# Patient Record
Sex: FEMALE | Race: WHITE | Employment: UNEMPLOYED | URBAN - METROPOLITAN AREA
[De-identification: names, ages, dates, MRNs, and addresses within clinical notes are randomized per-mention and may not be internally consistent; named-entity substitution may affect disease eponyms.]

---

## 2020-11-02 ENCOUNTER — HOSPITAL ENCOUNTER (OUTPATIENT)
Dept: PHYSICAL THERAPY | Age: 60
Discharge: HOME | End: 2020-11-02
Payer: MEDICARE

## 2020-11-02 PROCEDURE — 97161 PT EVAL LOW COMPLEX 20 MIN: CPT

## 2020-11-02 PROCEDURE — 97530 THERAPEUTIC ACTIVITIES: CPT

## 2020-11-02 PROCEDURE — 97110 THERAPEUTIC EXERCISES: CPT

## 2020-11-02 NOTE — PROGRESS NOTES
In [address] [place] [address], [address] [place] [address], [address] [place], [place] [address]y [address],[address]  [phone] fax      Plan of Care/ Statement of Necessity for Physical Therapy Services    Patient name: Kwasi Tucker Start of Care: 2020   Referral source: Filippo Horton MD : 1960    Medical Diagnosis: Low back pain [M54.5]  Payor: Raoul Garrett / Plan: [place] MED[place] A & B / Product Type: Medicare /  Onset Date:1+ year ago     Treatment Diagnosis: LBP   Prior Hospitalization: see medical history Provider#: [id number]   Medications: Verified on Patient summary List    Comorbidities: fibromyalgia, arthritis, thyroid problems, HTN, Pre cancer of the colon, interstitial cystitis, latex allergy, knee surgery X 2 right LE   Prior Level of Function: I all areas of ADLs and activities, on disability, tolerated household and community activities, drove       The Plan of Care and following information is based on the information from the initial evaluation. Assessment/ key information: 63 YO female diagnosed as above and with S/S consistent with above diagnosis presents to skilled outpatient PT CCO LBP worse on the left side. Onset 1+ years ago insideous onset that began with left lower leg pain and numbness. With certain movements and positions the pain would shoot up the left leg. CCO pain and soreness even at rest/sitting in the left lower back. She additionally has reports of right knee pain and PMHX with the right knee since  that she also attributes with some of her back pain due to limping.  decrease core and  LE strength, TTP left SIJ and piriformis, Patient demonstrates the potential to make gains with improved ROM, strength, endurance/activity tolerance, functional FOTO survey score   and all within a reasonable time frame so as to increase their functional independence with ADLs and activities for carryover to  Improved quality of life, tolerance to household and community activities and ability to resume her walking program. Patient requires skilled Physical Therapy so as to monitor their response to and modify their treatment plan accordingly. Patient appears to be an appropriate candidate for skilled outpatient Physical Therapy.     Evaluation Complexity History HIGH Complexity :3+ comorbidities / personal factors will impact the outcome/ POC ; Examination MEDIUM Complexity : 3 Standardized tests and measures addressing body structure, function, activity limitation and / or participation in recreation  ;Presentation LOW Complexity : Stable, uncomplicated  ;Clinical Decision Making MEDIUM Complexity : FOTO score of 26-74  Overall Complexity Rating: LOW   Problem List: pain affecting function, decrease ROM, decrease strength, decrease ADL/ functional abilitiies, decrease activity tolerance, decrease flexibility/ joint mobility, decrease transfer abilities and other FOTO 48   Treatment Plan may include any combination of the following: Therapeutic exercise, Therapeutic activities, Neuromuscular re-education, Physical agent/modality, Manual therapy, Patient education, Self Care training and Home safety training  Patient / Family readiness to learn indicated by: asking questions, trying to perform skills and interest  Persons(s) to be included in education: patient (P)  Barriers to Learning/Limitations: None  Patient Goal (s):  to learn how to deal with it so that it doesn't get worse and hope it gets better, avoid surgery  Patient Self Reported Health Status: did not answer  Rehabilitation Potential: good    Short Term Goals: To be accomplished in 5 treatments:   1 patient willhvae established and be I with HEP to aid with self management at discharge   EVAL issued   CURRENT    2 patient will have pain 3-4/10 to aid with increase tolerance to walking several blocks    EVAL 5, limited a lot   CURRENT    Long Term Goals:  To be accomplished in 12 treatments:   1 patient will have pain 2/10 to aid with increase tolerance to walking several blocks    EVAL 5, limited a lot   CURRENT   2 patient will have FOTO 56 to show projected gains in function and activities   EVAL 48   CURRENT   3 patient will tolerate TM 1/2 mile with no increase pain for carryover to resuming her walking program   EVAL used to tolerate 3 miles a day   CURRENT   4 patient will report overall 50% improvement to aid with increase tolerance to doing her usual activities   EVAL moderate difficulty    CURRENT    Frequency / Duration: Patient to be seen 2 times per week for 12 treatments. Patient/ Caregiver education and instruction: Diagnosis, prognosis, self care, activity modification and exercises   [x]  Plan of care has been reviewed with PTA    Certification Period: 11/2/20 - 12/1/20  Amelia Costa, PT 11/2/2020 5:47 PM    ________________________________________________________________________    I certify that the above Therapy Services are being furnished while the patient is under my care. I agree with the treatment plan and certify that this therapy is necessary.     Physician's Signature:____________Date:_________TIME:________    [facility], Date and Time must be completed for valid certification **    Please sign and return to In [address] [place] [address], [address] [place] [address], [address] [place] [address],[facility] 300  [phone] fax

## 2020-11-04 ENCOUNTER — HOSPITAL ENCOUNTER (OUTPATIENT)
Dept: PHYSICAL THERAPY | Age: 60
Discharge: HOME | End: 2020-11-04
Payer: MEDICARE

## 2020-11-04 PROCEDURE — 97140 MANUAL THERAPY 1/> REGIONS: CPT

## 2020-11-04 PROCEDURE — 97112 NEUROMUSCULAR REEDUCATION: CPT

## 2020-11-04 PROCEDURE — 97110 THERAPEUTIC EXERCISES: CPT

## 2020-11-04 NOTE — PROGRESS NOTES
PT DAILY TREATMENT NOTE     Patient Name: Jonathon Quiñonez  Date:2020  : 1960  [x]  Patient  Verified  Payor: Sugey Elders / Plan: [place] MEDICARE PART A & B / Product Type: Medicare /    In time: 3:53 Out time:4:48  Total Treatment Time (min): 48  Visit #: 2 of 12    Medicare/BCBS Only   Total Timed Codes (min):  38 1:1 Treatment Time:  38       Treatment Area: Low back pain [M54.5]    SUBJECTIVE  Pain Level (0-10 scale): 5  Any medication changes, allergies to medications, adverse drug reactions, diagnosis change, or new procedure performed?: [x] No    [] Yes (see summary sheet for update)  Subjective functional status/changes:   [] No changes reporte  \"tightness. \"  OBJECTIVE    Modality rationale: decrease edema, decrease inflammation, decrease pain and increase tissue extensibility to improve the patients ability to perform ADL   Min Type Additional Details    [] Estim:  []Unatt       []IFC  []Premod                        []Other:  []w/ice   []w/heat  Position:  Location:    [] Estim: []Att    []TENS instruct  []NMES                    []Other:  []w/US   []w/ice   []w/heat  Position:  Location:    []  Traction: [] Cervical       []Lumbar                       [] Prone          []Supine                       []Intermittent   []Continuous Lbs:  [] before manual  [] after manual    []  Ultrasound: []Continuous   [] Pulsed                           []1MHz   []3MHz W/cm2:  Location:    []  Iontophoresis with dexamethasone         Location: [] Take home patch   [] In clinic   10 [x]  Ice  post   []  heat  []  Ice massage  []  Laser   []  Anodyne Position:prone  Location:(B) LSP    []  Laser with stim  []  Other:  Position:  Location:    []  Vasopneumatic Device Pressure:       [] lo [] med [] hi   Temperature: [] lo [] med [] hi   [x] Skin assessment post-treatment:  [x]intact []redness- no adverse reaction    []redness - adverse reaction:      min []Eval                  []Re-Eval       20 min Therapeutic Exercise:  [x] See flow sheet :   Rationale: increase ROM and increase strength to improve the patients ability to perform ADL      min Therapeutic Activity:  [x]  See flow sheet :   Rationale: increase ROM and increase strength  to improve the patients ability to perform ADL      8 min Neuromuscular Re-education:  []  See flow sheet :   Rationale: increase ROM, increase strength, improve coordination, improve balance and increase proprioception  to improve the patients ability to perform ADL     10 min Manual Therapy:  P/A mob  3-4 TSP/LSP  prone   The manual therapy interventions were performed at a separate and distinct time from the therapeutic activities interventions. Rationale: decrease pain to perform ADL      min Gait Training:  ___ feet with ___ device on level surfaces with ___ level of assist   Rationale: With   [x] TE   [] TA   [] neuro   [] other: Patient Education: [x] Review HEP    [] Progressed/Changed HEP based on:   [] positioning   [] body mechanics   [] transfers   [] heat/ice application    [] other:      Other Objective/Functional Measures:      Pain Level (0-10 scale) post treatment: 3    ASSESSMENT/Changes in Function: Pt reports  Pain with bending at L4&L5. Pt completed each there ex fairly well. Pt exhibits tightness at mid back. Pt reports difficulty ascending steps. Following manual pain was reduced and decreased tightness. Patient will continue to benefit from skilled PT services to address functional mobility deficits, address ROM deficits, address strength deficits, analyze and address soft tissue restrictions, analyze and cue movement patterns, analyze and modify body mechanics/ergonomics and instruct in home and community integration to attain remaining goals.      [x]  See Plan of Care  []  See progress note/recertification  []  See Discharge Summary         Progress towards goals / Updated goals:     1 patient willhvae established and be I with HEP to aid with self management at discharge              EVAL issued              CURRENT progressing 11/4              2 patient will have pain 3-4/10 to aid with increase tolerance to walking several blocks               EVAL 5, limited a lot              CURRENT     Long Term Goals:  To be accomplished in 12 treatments:              1 patient will have pain 2/10 to aid with increase tolerance to walking several blocks               EVAL 5, limited a lot              CURRENT              2 patient will have FOTO 56 to show projected gains in function and activities              EVAL 48              CURRENT              3 patient will tolerate TM 1/2 mile with no increase pain for carryover to resuming her walking program              EVAL used to tolerate 3 miles a day              CURRENT              4 patient will report overall 50% improvement to aid with increase tolerance to doing her usual activities              EVAL moderate difficulty               CURRENT    PLAN  []  Upgrade activities as tolerated     []  Continue plan of care  []  Update interventions per flow sheet       []  Discharge due to:_  []  Other:_      Era Zaragoza PTA 11/4/2020  3:54 PM    Future Appointments   Date Time Provider Martir Richards   11/10/2020  3:45 PM Patrici Darryl [facility]   11/13/2020  3:45 PM Patrici Darryl [facility]   11/17/2020  2:15 PM Patrici Darryl [facility]   11/20/2020  3:00 PM Era Zaragoza, PTA [facility]   11/23/2020  1:30 PM Ceci Hernandez, PTA [facility]   11/25/2020 12:00 PM Era Zaragoza, PTA [facility]   12/1/2020  3:00 PM Candace Figureoa [facility]   12/4/2020  2:15 PM Barney Martin, PT [facility]   12/8/2020  1:30 PM Barney Martin, PT [facility]   12/11/2020  1:30 PM Barney Martin, PT [facility]

## 2020-11-10 ENCOUNTER — APPOINTMENT (OUTPATIENT)
Dept: PHYSICAL THERAPY | Age: 60
End: 2020-11-10
Payer: MEDICARE

## 2020-11-17 ENCOUNTER — APPOINTMENT (OUTPATIENT)
Dept: PHYSICAL THERAPY | Age: 60
End: 2020-11-17
Payer: MEDICARE

## 2020-11-20 ENCOUNTER — APPOINTMENT (OUTPATIENT)
Dept: PHYSICAL THERAPY | Age: 60
End: 2020-11-20
Payer: MEDICARE

## 2020-12-01 NOTE — PROGRESS NOTES
[facility] [address], [address] [place] [address], [address]  [phone] fax    Discharge Summary  Patient name: Girma Mariano Start of Care: 2020   Referral source: John Heart MD : 1960               Medical Diagnosis: Low back pain [M54.5]  Payor: Suad Peralta / Plan: [address] / Product Type: Medicare /  Onset Date:1+ year ago               Treatment Diagnosis: LBP   Prior Hospitalization: see medical history Provider#: [id number]   Medications: Verified on Patient summary List    Comorbidities: fibromyalgia, arthritis, thyroid problems, HTN, Pre cancer of the colon, interstitial cystitis, latex allergy, knee surgery X 2 right LE   Prior Level of Function: I all areas of ADLs and activities, on disability, tolerated household and community activities, drove     Visits from Start of Care: 2    Missed Visits: 5  Reporting Period : 20 to 20    Summary of Care: Pt has completed 2 skilled PT interventions to address LBP. Due to lack of attendance, pt is discharged from PT. Unable to fully reassess as planned. Discharge recommendations are to complete HEP I and follow-up with physician as needed.     Goal: patient will have established and be I with HEP to aid with self management at discharge  Status at last note/certification: progressing  Status at discharge: not met    Goal: patient will have pain 2/10 to aid with increase tolerance to walking several blocks   Status at last note/certification: 260  Status at discharge: not met    Goal: patient will have FOTO 56 to show projected gains in function and activities  Status at last note/certification: 48  Status at discharge: not met    Goal: patient will tolerate TM 1/2 mile with no increase pain for carryover to resuming her walking program  Status at last note/certification: used to tolerate 3 miles a day  Status at discharge: not met    Goal: patient will report overall 50% improvement to aid with increase tolerance to doing her usual activities  Status at last note/certification: progressing  Status at discharge: not met      ASSESSMENT/RECOMMENDATIONS:  []Discontinue therapy progressing towards or have reached established goals  []Discontinue therapy due to lack of appreciable progress towards goals  [x]Discontinue therapy due to lack of attendance or compliance  []Other:     Thank you for this referral.     Catia Allen 12/1/2020 3:46 PM

## 2020-12-04 ENCOUNTER — APPOINTMENT (OUTPATIENT)
Dept: PHYSICAL THERAPY | Age: 60
End: 2020-12-04

## 2020-12-08 ENCOUNTER — APPOINTMENT (OUTPATIENT)
Dept: PHYSICAL THERAPY | Age: 60
End: 2020-12-08

## 2020-12-11 ENCOUNTER — APPOINTMENT (OUTPATIENT)
Dept: PHYSICAL THERAPY | Age: 60
End: 2020-12-11

## 2022-06-01 ENCOUNTER — HOSPITAL ENCOUNTER (OUTPATIENT)
Dept: PHYSICAL THERAPY | Age: 62
Discharge: HOME | End: 2022-06-01
Payer: MEDICARE

## 2022-06-01 PROCEDURE — 97530 THERAPEUTIC ACTIVITIES: CPT

## 2022-06-01 PROCEDURE — 97162 PT EVAL MOD COMPLEX 30 MIN: CPT

## 2022-06-01 PROCEDURE — 97110 THERAPEUTIC EXERCISES: CPT

## 2022-06-01 NOTE — PROGRESS NOTES
PT DAILY TREATMENT LOWER [facility] [id number]    Patient Name: Jimmie Nguyen  Date:2022  : 1960  [x]  Patient  Verified  Payor: Carolyn Stanton / Plan: [place] MEDICARE PART A & B / Product Type: Medicare /    In time: 3:46P  Out time:4:31P  Total Treatment Time (min): 45  Visit #: 1 of 10    Medicare/BCBS Only   Total Timed Codes (min):  25 1:1 Treatment Time:  45     Treatment Area: Left knee pain [M25.562]  Right knee pain [M25.561]  Other low back pain [M54.59]    SUBJECTIVE  Pain Level (0-10 scale): 5  []constant []intermittent []improving []worsening []no change since onset    Any medication changes, allergies to medications, adverse drug reactions, diagnosis change, or new procedure performed?: [x] No    [] Yes (see summary sheet for update)  Subjective functional status/changes:     PLOF: Ind/ manageable pain performing ADL/IADLs, self care tasks, recreational participation, ascending/descending stairs and ambulation/standing prolonged durations  Limitations to PLOF: Increased Pain, Decreased Activity Tolerance  Mechanism of Injury: Onset of right knee pain began in  (in which pt underwent surg), left knee pain began in , followed by onset of LBP shortly after - both from insidious onset, however contributes low back pain to carrying for paraplegic  in (since has passed away from [place] [address]). States receiving dx of bakers cyst in left knee and synovial cyst in lower back. Current symptoms: Describes pain as achy/sharp located at B anterior/medial knees and B low back (left>right). Aggravating factors include standing extended durations, climbing stairs and bending back. Easing factors include rest/ice. Numbness, tingling and heaviness \"tens unit for socks\" in B LE. Denies warmth sensation and redness to B LE.   PMHx/Surgical Hx: Erythyromyalgia,Arthritis, High Blood Pressure, Latex Allergy, Hx of Pre Colon Ca, Scolosis, Interstysial Cystitis, Fibromyalgia, Urinary Incontinence, Bladder Suspension/Small Intenstine Surg, Hysterecomty, Right Patellar Fx, COPD, COPE, Chronic Fatigue, Anxiety, Right Knee Surg,  Work Hx: Disable  Pt Goals: pain relief      OBJECTIVE/EXAMINATION    20 min [x]Eval                  []Re-Eval       8 min Therapeutic Exercise:  [x] See flow sheet :   Rationale: increase ROM and increase strength to improve the patients ability to perform ADLs with greater ease     15 min Therapeutic Activity:  []  See flow sheet :   Rationale: increase patient awarness/education of relevant anatomy/patho, activity/positional modifications and prescribed HEP to improve the patients ability to return to PLOF                                                                 With   [x] TE   [x] TA   [] neuro   [] other: Patient Education: [x] Review HEP    [] Progressed/Changed HEP based on:   [] positioning   [] body mechanics   [] transfers   [] heat/ice application    [] other:      Observation/Inspection:  Skin Temperature WNL B LE    Posture: Increased Lumbar Lordosis  Increased weight shift to right LE    Gait:  Min contraletal hip drop during subsequent stance phases    Functional Sit to Stand:  Increased right LE weightshift, w/B UE push off    Balance/Stability:  SLS: Right LE: 11 sec ; Left LE: 10 sec    AROM:  Lumbar (% of WNL) -  Flex: 75  Ext: 25, p!   Right SB: 75  Left SB: 75  Right Rotation: 75  Left Rotation: 75    Knee (deg) -  TC, to be assessed at upcoming f/u    MMT (1-5):  Right Hip Flex: 4  Right Hip IR/ER: 4  Right Knee Ext: 4+  Right Knee Flex: 4+  Right Hip Abd: 4-  Left Hip Flex: 4  Left Hip IR/ER:4  Left Knee Ext: 4+  Left Knee Flex: 4+  Left Hip Abd: 3+  Core: Inability to maintain PPT w/ DL and/or SL lowering while supine on plinth    Joint Mobility:  Patellar: B Hypomobility w/ inf<>sup, med<>lateral glides  Tibiofemoral: BHypomobility w/ AP/PA glides      Flexibility: [] Unable to assess at this time  Hamstrings:    (L) Tightness= [] WNL   [] Min   [x] Mod   [] Severe    (R) Tightness= [] WNL   [] Min   [x] Mod   [] Severe  Quadriceps:    (L) Tightness= [] WNL   [] Min   [x] Mod   [] Severe    (R) Tightness= [] WNL   [] Min   [x] Mod   [] Severe  Gastroc:      (L) Tightness= [] WNL   [] Min   [x] Mod   [] Severe    (R) Tightness= [] WNL   [] Min   [x] Mod   [] Severe  Other:    Palpation: Mod TTP B QL/lumbar paraspinals, B proximal gastroc and distal adductors/hamstrings    Special Tests:  TC; To be assessed in upcoming f/u as indicated    Other:  Pelvic Symmetry: TC; To be assessed in upcoming f/u as indicated      Pain Level (0-10 scale) post treatment: 5    ASSESSMENT/Changes in Function: See POC     Patient will continue to benefit from skilled PT services to modify and progress therapeutic interventions, address functional mobility deficits, address ROM deficits, address strength deficits, analyze and address soft tissue restrictions, analyze and cue movement patterns, analyze and modify body mechanics/ergonomics, assess and modify postural abnormalities and instruct in home and community integration to attain remaining goals.      [x]  See Plan of Care  []  See progress note/recertification  []  See Discharge Summary         Progress towards goals / Updated goals:  See POC     PLAN  [x]  Upgrade activities as tolerated     [x]  Continue plan of care  [x]  Update interventions per flow sheet       []  Discharge due to:_  []  Other:_      Citlalli Kelly DPT 6/1/2022  3:53 PM

## 2022-06-01 NOTE — PROGRESS NOTES
In [address] [place] [address] Square  [address] [place] [address], [address] [place] [address], [address] [place], [place] [address]y [address],Adam 300  [phone] fax      Plan of Care/ Statement of Necessity for Physical Therapy Services    Patient name: Kerry Stone Start of Care: 2022   Referral source: Alicja Poole DO : 1960    Medical Diagnosis: Left knee pain [M25.562]  Right knee pain [M25.561]  Other low back pain [M54.59]  Payor: VA MEDICARE / Plan: VA MEDICARE PART A & B / Product Type: Medicare /  Onset Date: chronic ongoing ~ most recent flare up within past year    Treatment Diagnosis:  Pain in Right Knee, Pain in Left Knee, Low Back Pain    Prior Hospitalization: see medical history Provider#: [id number]   Medications: Verified on Patient summary List    Comorbidities: Erythyromyalgia,Arthritis, High Blood Pressure, Latex Allergy, Hx of Pre Colon Ca, Scolosis, Interstysial Cystitis, Fibromyalgia, Urinary Incontinence, Bladder Suspension/Small Intenstine Surg, Hysterecomty, Right Patellar Fx, COPD, COPE, Chronic Fatigue, Anxiety, Right Knee Surg   Prior Level of Function: Ind/ manageable pain performing ADL/IADLs, self care tasks, recreational participation, ascending/descending stairs and ambulation/standing prolonged durations      The Plan of Care and following information is based on the information from the initial evaluation. Assessment/ key information:   Patient presents to clinic secondary to insidious onset of subacute on chronic flare up of low back and B Knee pain. Today's clinical examination demonstrates significant soft tissue restrictions/TTP through B QL/lumbar paraspinals, B proximal gastroc and distal adductors/hamstrings, decreased function (evident by FOTO score), decreased AROM/flexibility, decreased strength/stability and overall mobility limitations/compensatory movement patterns.  These limitations affect the patient's ability to perform ADLs/IADLs, self care tasks, and recreational activities, efficiently, safely and pain free. The patient would benefit from skilled physical therapy interventions to address the aforementioned impairments in order to return to her PLOF of completing all functional activities with manageable pain levels and greatest independently. Evaluation Complexity History HIGH Complexity :3+ comorbidities / personal factors will impact the outcome/ POC ; Examination MEDIUM Complexity : 3 Standardized tests and measures addressing body structure, function, activity limitation and / or participation in recreation  ;Presentation MEDIUM Complexity : Evolving with changing characteristics  ; Clinical Decision Making MEDIUM Complexity : FOTO score of 26-74  Overall Complexity Rating: MEDIUM  Problem List: pain affecting function, decrease ROM, decrease strength, impaired gait/ balance, decrease ADL/ functional abilitiies, decrease activity tolerance, decrease flexibility/ joint mobility and decrease transfer abilities   Treatment Plan may include any combination of the following: Therapeutic exercise, Therapeutic activities, Neuromuscular re-education, Physical agent/modality, Gait/balance training, Manual therapy, Patient education, Self Care training, Functional mobility training, Home safety training and Stair training  Patient / Family readiness to learn indicated by: asking questions, trying to perform skills and interest  Persons(s) to be included in education: patient (P)  Barriers to Learning/Limitations: None  Patient Goal (s): pain relief  Patient Self Reported Health Status: good  Rehabilitation Potential: good    Short Term Goals: To be accomplished in 2 treatments:  1. Patient will become proficient in their HEP and will be compliant in performing that program.  Evaluation: HEP established. Long Term Goals: To be accomplished in 5 weeks:  1. Patient's pain level will be 0-4/10 with activity in order to improve patient's ability to perform normal ADLs.   Evaluation: 3-7/10 with activity    2. Patient will increase FOTO score to >/= 56 points to indicate increased functional mobility. Evaluation: 42 points    3. Patient will demonstrate B hip abd MMT >/= 4+/5 to perform ADL/IADLs with greater ease  Evaluation: Left Hip Abd: 3+/5, Right Hip Abd: 4-/5    4. Patient will report a >/= 75% improvement in current condition in order to demonstrate a return to PLOF  Evaluation: 0    Frequency / Duration: Patient to be seen 2 times per week for 5 weeks. Patient/ Caregiver education and instruction: Diagnosis, prognosis, self care, activity modification and exercises   [x]  Plan of care has been reviewed with PTA    Certification Period: 06/01/22 - 06/30/22  Hammad Douglas DPT 6/1/2022 1:38 PM    ________________________________________________________________________    I certify that the above Therapy Services are being furnished while the patient is under my care. I agree with the treatment plan and certify that this therapy is necessary.     [address] [place] [address] Signature:____________Date:_________TIME:________     Anabella Marion I, DO  ** Signature, Date and Time must be completed for valid certification **    Please sign and return to In [address] [place] [address] Square  [address] [place] [address], [address] [place] [address], [address] [place] [address] 434,[facility] 300  [phone] fax

## 2022-06-06 ENCOUNTER — HOSPITAL ENCOUNTER (OUTPATIENT)
Dept: PHYSICAL THERAPY | Age: 62
Discharge: HOME | End: 2022-06-06
Payer: MEDICARE

## 2022-06-06 PROCEDURE — 97110 THERAPEUTIC EXERCISES: CPT

## 2022-06-06 PROCEDURE — 97530 THERAPEUTIC ACTIVITIES: CPT

## 2022-06-06 NOTE — PROGRESS NOTES
PT DAILY TREATMENT NOTE     Patient Name: Néstor November  Date:2022  : 1960  [x]  Patient  Verified  Payor: VA MEDICARE / Plan: VA MEDICARE PART A & B / Product Type: Medicare /    In time: 2:22  Out time:3:02  Total Treatment Time (min): 40  Visit #: 2 of 10    Medicare/BCBS Only   Total Timed Codes (min):   1:1 Treatment Time:         Treatment Area: Left knee pain [M25.562]  Right knee pain [M25.561]  Other low back pain [M54.59]    SUBJECTIVE  Pain Level (0-10 scale): 3  Any medication changes, allergies to medications, adverse drug reactions, diagnosis change, or new procedure performed?: [x] No    [] Yes (see summary sheet for update)  Subjective functional status/changes:   [] No changes reported  \":Bakers cyst hurting behind my left knee. \"    OBJECTIVE    Modality rationale: decrease edema, decrease inflammation, decrease pain, increase tissue extensibility and increase muscle contraction/control to improve the patients ability to perform ADL    Min Type Additional Details    [] Estim:  []Unatt       []IFC  []Premod                        []Other:  []w/ice   []w/heat  Position:  Location:    [] Estim: []Att    []TENS instruct  []NMES                    []Other:  []w/US   []w/ice   []w/heat  Position:  Location:    []  Traction: [] Cervical       []Lumbar                       [] Prone          []Supine                       []Intermittent   []Continuous Lbs:  [] before manual  [] after manual    []  Ultrasound: []Continuous   [] Pulsed                           []1MHz   []3MHz W/cm2:  Location:    []  Iontophoresis with dexamethasone         Location: [] Take home patch   [] In clinic    []  Ice     []  heat  []  Ice massage  []  Laser   []  Anodyne Position:  Location:    []  Laser with stim  []  Other:  Position:  Location:    []  Vasopneumatic Device    []  Right     []  Left  Pre-treatment girth:  Post-treatment girth:  Measured at (location):  Pressure:       [] lo [] med [] hi Temperature: [] lo [] med [] hi   [x] Skin assessment post-treatment:  [x]intact []redness- no adverse reaction    []redness - adverse reaction:      min []Eval                  []Re-Eval       25 min Therapeutic Exercise:  [x] See flow sheet :   Rationale: increase ROM and increase strength to improve the patients ability to perform household chores with no limitations    15 min Therapeutic Activity:  [x]  See flow sheet :   Rationale: increase ROM, increase strength and improve coordination  to improve the patients ability to       min Neuromuscular Re-education:  []  See flow sheet :   Rationale: increase ROM, increase strength, improve coordination, improve balance and increase proprioception  to improve the patients ability to perform squat with correct form     min Manual Therapy: The manual therapy interventions were performed at a separate and distinct time from the therapeutic activities interventions. Rationale: decrease pain, increase ROM, increase tissue extensibility, decrease edema , decrease trigger points and increase postural awareness to perform ADL      min Gait Training:  ___ feet with ___ device on level surfaces with ___ level of assist   Rationale: With   [] TE   [] TA   [] neuro   [] other: Patient Education: [x] Review HEP    [] Progressed/Changed HEP based on:   [] positioning   [] body mechanics   [] transfers   [] heat/ice application    [] other:      Other Objective/Functional Measures:      Pain Level (0-10 scale) post treatment: 0    ASSESSMENT/Changes in Function: Pt presents with tightness at right piriformis during piriformis stretch. Pt completed each there ex fairly well with cues. Therapist discussed with pt on importance of performing HEP 2x day. Pt understood. Pt benefited with treatment due to no pain.     Patient will continue to benefit from skilled PT services to address functional mobility deficits, address ROM deficits, address strength deficits, analyze and address soft tissue restrictions, analyze and cue movement patterns, analyze and modify body mechanics/ergonomics, assess and modify postural abnormalities and instruct in home and community integration to attain remaining goals. [x]  See Plan of Care  []  See progress note/recertification  []  See Discharge Summary         Progress towards goals / Updated goals:  1. Patient will become proficient in their HEP and will be compliant in performing that program.  Evaluation: HEP established.   \"Current: will start today  6/6/22  Long Term Goals: To be accomplished in 5 weeks:  1. Patient's pain level will be 0-4/10 with activity in order to improve patient's ability to perform normal ADLs. Evaluation: 3-7/10 with activity     2. Patient will increase FOTO score to >/= 56 points to indicate increased functional mobility. Evaluation: 42 points     3. Patient will demonstrate B hip abd MMT >/= 4+/5 to perform ADL/IADLs with greater ease  Evaluation: Left Hip Abd: 3+/5, Right Hip Abd: 4-/5     4.  Patient will report a >/= 75% improvement in current condition in order to demonstrate a return to PLOF  Evaluation: 0    PLAN  []  Upgrade activities as tolerated     []  Continue plan of care  []  Update interventions per flow sheet       []  Discharge due to:_  []  Other:_      Moris Friday, PTA 6/6/2022  2:20 PM    Future Appointments   Date Time Provider Martir Richards   6/9/2022  2:15 PM Umair Salazar PTA [facility]   6/13/2022  2:15 PM Vitaliy Castillo, PT [facility]   6/16/2022  2:15 PM Shobha Tanana [facility]   6/20/2022  2:15 PM Vitaliy Castillo, PT [facility]   6/23/2022 12:00 PM Vitaliy Castillo, PT [facility]   6/27/2022  2:15 PM Umair Salazar PTA [facility]   7/28/2022  1:30 PM MD Leonel Conner   9/12/2022 12:20 PM Jerry Darling, BECKIE Castaneda   9/19/2022  1:20 PM Jerry Darling, [address] [place] [address]

## 2022-06-09 ENCOUNTER — HOSPITAL ENCOUNTER (OUTPATIENT)
Dept: PHYSICAL THERAPY | Age: 62
Discharge: HOME | End: 2022-06-09
Payer: MEDICARE

## 2022-06-09 PROCEDURE — 97110 THERAPEUTIC EXERCISES: CPT

## 2022-06-09 PROCEDURE — 97530 THERAPEUTIC ACTIVITIES: CPT

## 2022-06-09 NOTE — PROGRESS NOTES
PT DAILY TREATMENT NOTE     Patient Name: Bettie Arnold  Date:2022  : 1960  [x]  Patient  Verified  Payor: VA MEDICARE / Plan: VA MEDICARE PART A & B / Product Type: Medicare /    In time:12:08  Out time: 12:46  Total Treatment Time (min): 38  Visit #: 3 of 10    Medicare/BCBS Only   Total Timed Codes (min):  38 1:1 Treatment Time:  38       Treatment Area: Left knee pain [M25.562]  Right knee pain [M25.561]  Other low back pain [M54.59]    SUBJECTIVE  Pain Level (0-10 scale): 1  Any medication changes, allergies to medications, adverse drug reactions, diagnosis change, or new procedure performed?: [x] No    [] Yes (see summary sheet for update)  Subjective functional status/changes:   [] No changes reported  \"Feeling ok. \"    OBJECTIVE    Modality rationale: decrease pain, increase tissue extensibility and increase muscle contraction/control to improve the patients ability to perform ADL    Min Type Additional Details    [] Estim:  []Unatt       []IFC  []Premod                        []Other:  []w/ice   []w/heat  Position:  Location:    [] Estim: []Att    []TENS instruct  []NMES                    []Other:  []w/US   []w/ice   []w/heat  Position:  Location:    []  Traction: [] Cervical       []Lumbar                       [] Prone          []Supine                       []Intermittent   []Continuous Lbs:  [] before manual  [] after manual    []  Ultrasound: []Continuous   [] Pulsed                           []1MHz   []3MHz W/cm2:  Location:    []  Iontophoresis with dexamethasone         Location: [] Take home patch   [] In clinic    []  Ice     []  heat  []  Ice massage  []  Laser   []  Anodyne Position:  Location:    []  Laser with stim  []  Other:  Position:  Location:    []  Vasopneumatic Device    []  Right     []  Left  Pre-treatment girth:  Post-treatment girth:  Measured at (location):  Pressure:       [] lo [] med [] hi   Temperature: [] lo [] med [] hi   [x] Skin assessment post-treatment:  [x]intact []redness- no adverse reaction    []redness - adverse reaction:      min []Eval                  []Re-Eval       23 min Therapeutic Exercise:  [x] See flow sheet :   Rationale: increase ROM and increase strength to improve the patients ability to perform ADL with ease    15 min Therapeutic Activity:  [x]  See flow sheet :   Rationale: increase ROM, increase strength and improve coordination  to improve the patients ability to perform ADL with ease      min Neuromuscular Re-education:  []  See flow sheet :   Rationale: increase ROM, increase strength, improve coordination, improve balance and increase proprioception  to improve the patients ability to ascend/descend stairs with correct sequence     min Manual Therapy: The manual therapy interventions were performed at a separate and distinct time from the therapeutic activities interventions. Rationale: decrease pain, increase ROM, increase tissue extensibility, decrease edema , decrease trigger points and increase postural awareness to perform ADL      min Gait Training:  ___ feet with ___ device on level surfaces with ___ level of assist   Rationale: With   [] TE   [] TA   [] neuro   [] other: Patient Education: [x] Review HEP    [] Progressed/Changed HEP based on:   [] positioning   [] body mechanics   [] transfers   [] heat/ice application    [] other:      Other Objective/Functional Measures:  TB rows/ext    Pain Level (0-10 scale) post treatment:  0    ASSESSMENT/Changes in Function: Pt required cues on performing squats with correct form. Overall pt responded fairly well with core and quad strengthening there ex today with no pain following. Pt reported, that her left knee slips outward at times. Therapist told pt that due to knee muscle need to be strengthening.     Patient will continue to benefit from skilled PT services to address functional mobility deficits, address ROM deficits, address strength deficits, analyze and address soft tissue restrictions, analyze and cue movement patterns, analyze and modify body mechanics/ergonomics, assess and modify postural abnormalities and instruct in home and community integration to attain remaining goals. [x]  See Plan of Care  []  See progress note/recertification  []  See Discharge Summary         Progress towards goals / Updated goals:  1. Patient will become proficient in their HEP and will be compliant in performing that program.  Evaluation: HEP established.   \"Current: will start today  6/6/22  Long Term Goals: To be accomplished in 5 weeks:  1. Patient's pain level will be 0-4/10 with activity in order to improve patient's ability to perform normal ADLs. Evaluation: 3-7/10 with activity   Current:  1  6/9/22  2. Patient will increase FOTO score to >/= 56 points to indicate increased functional mobility. Evaluation: 42 points     3. Patient will demonstrate B hip abd MMT >/= 4+/5 to perform ADL/IADLs with greater ease  Evaluation: Left Hip Abd: 3+/5, Right Hip Abd: 4-/5     4.  Patient will report a >/= 75% improvement in current condition in order to demonstrate a return to PLOF  Evaluation: 0    PLAN  [x]  Upgrade activities as tolerated     []  Continue plan of care  []  Update interventions per flow sheet       []  Discharge due to:_  []  Other:_      Adam Morocho, DUNCAN 6/9/2022  12:19 PM    Future Appointments   Date Time Provider Martir Richards   6/13/2022  2:15 PM Julienne Vazquez PT [facility]   6/16/2022  2:15 PM Grant Loya [facility]   6/20/2022  2:15 PM Julienne Vazquez PT [facility]   6/23/2022 12:00 PM Julienne Vazquez PT [facility]   6/27/2022  2:15 PM Jasvir Ritter PTA [facility]   7/28/2022  1:30 PM Louisa Thorne MD [facility]   9/12/2022 12:20 PM Prasanna Hernandez, [address] [place] [address]   9/19/2022  1:20 PM Prasanna Hernandez, [facility]

## 2022-06-13 ENCOUNTER — APPOINTMENT (OUTPATIENT)
Dept: PHYSICAL THERAPY | Age: 62
End: 2022-06-13
Payer: MEDICARE

## 2022-06-16 ENCOUNTER — HOSPITAL ENCOUNTER (OUTPATIENT)
Dept: PHYSICAL THERAPY | Age: 62
Discharge: HOME | End: 2022-06-16
Payer: MEDICARE

## 2022-06-16 PROCEDURE — 97112 NEUROMUSCULAR REEDUCATION: CPT

## 2022-06-16 PROCEDURE — 97110 THERAPEUTIC EXERCISES: CPT

## 2022-06-16 NOTE — PROGRESS NOTES
PT DAILY TREATMENT NOTE     Patient Name: Franky Greenberg  Date:2022  : 1960  [x]  Patient  Verified  Payor: Elodia Reed / Plan: VA MEDICARE PART A & B / Product Type: Medicare /    In time:2:22pm  Out time:3:00pm  Total Treatment Time (min): 38  Visit #: 4 of 10    Medicare/BCBS Only   Total Timed Codes (min):  38 1:1 Treatment Time:  38       Treatment Area: Left knee pain [M25.562]  Right knee pain [M25.561]  Other low back pain [M54.59]    SUBJECTIVE  Pain Level (0-10 scale): 2  Any medication changes, allergies to medications, adverse drug reactions, diagnosis change, or new procedure performed?: [x] No    [] Yes (see summary sheet for update)  Subjective functional status/changes:   [] No changes reported  \"I feel a little bit of pain in my back and knees. \"    OBJECTIVE     13 min Therapeutic Exercise:  [] See flow sheet :   Rationale: increase ROM and increase strength to improve the patients ability to perform daily activities with ease     25 min Neuromuscular Re-education:  []  See flow sheet :   Rationale: increase strength, improve coordination and increase proprioception  to improve the patients ability to maintain core stability with movement          With   [] TE   [] TA   [] neuro   [] other: Patient Education: [x] Review HEP    [] Progressed/Changed HEP based on:   [] positioning   [] body mechanics   [] transfers   [] heat/ice application    [] other:      Other Objective/Functional Measures:      Pain Level (0-10 scale) post treatment: 0    ASSESSMENT/Changes in Function: Today's session focused on interventions to improve core strength and stability with movement. Patient was challenged with sled push and pull reporting increased pulling on right side. Instructed in march and crunch with theraband to increase core strength and stability with movement.      Patient will continue to benefit from skilled PT services to address functional mobility deficits, address ROM deficits, address strength deficits, analyze and address soft tissue restrictions, analyze and cue movement patterns, analyze and modify body mechanics/ergonomics, assess and modify postural abnormalities and instruct in home and community integration to attain remaining goals. []  See Plan of Care  []  See progress note/recertification  []  See Discharge Summary         Progress towards goals / Updated goals:  1. Patient will become proficient in their HEP and will be compliant in performing that program.  Evaluation: HEP established.   \"Current: will start today  6/6/22  Long Term Goals: To be accomplished in 5 weeks:  1. Patient's pain level will be 0-4/10 with activity in order to improve patient's ability to perform normal ADLs. Evaluation: 3-7/10 with activity   Current:  1  6/9/22  2. Patient will increase FOTO score to >/= 56 points to indicate increased functional mobility. Evaluation: 42 points     3. Patient will demonstrate B hip abd MMT >/= 4+/5 to perform ADL/IADLs with greater ease  Evaluation: Left Hip Abd: 3+/5, Right Hip Abd: 4-/5     4.  Patient will report a >/= 75% improvement in current condition in order to demonstrate a return to PLOF  Evaluation: 0    PLAN  [x]  Upgrade activities as tolerated     [x]  Continue plan of care  [x]  Update interventions per flow sheet       []  Discharge due to:_  []  Other:_      Tang Hutchinson, PT 6/16/2022  2:37 PM    Future Appointments   Date Time Provider Maritr Richards   6/17/2022  3:45 PM Alida Brito, PT [facility]   6/20/2022  2:15 PM Burt Kurtz PT [facility]   6/23/2022 12:00 PM Burt Kurtz PT [facility]   6/27/2022  2:15 PM Allie Nicole PTA [facility]   7/28/2022  1:30 PM Alena French MD [id number]   9/12/2022 12:20 PM Dyan Gardner, PT [facility]   9/19/2022  1:20 PM Dyan Gardner, [address] [place] [address]

## 2022-06-17 ENCOUNTER — APPOINTMENT (OUTPATIENT)
Dept: PHYSICAL THERAPY | Age: 62
End: 2022-06-17
Payer: MEDICARE

## 2022-06-20 ENCOUNTER — HOSPITAL ENCOUNTER (OUTPATIENT)
Dept: PHYSICAL THERAPY | Age: 62
Discharge: HOME | End: 2022-06-20
Payer: MEDICARE

## 2022-06-20 PROCEDURE — 97110 THERAPEUTIC EXERCISES: CPT

## 2022-06-20 PROCEDURE — 97530 THERAPEUTIC ACTIVITIES: CPT

## 2022-06-20 PROCEDURE — 97112 NEUROMUSCULAR REEDUCATION: CPT

## 2022-06-20 NOTE — PROGRESS NOTES
PT DAILY TREATMENT NOTE     Patient Name: Kerry Stone  Date:2022  : 1960  [x]  Patient  Verified  Payor: [place] MEDICARE / Plan: VA MEDICARE PART A & B / Product Type: Medicare /    In time:215  Out time:300  Total Treatment Time (min): 45  Visit #: 5 of 10    Medicare/BCBS Only   Total Timed Codes (min):  45 1:1 Treatment Time:  45       Treatment Area: Left knee pain [M25.562]  Right knee pain [M25.561]  Other low back pain [M54.59]    SUBJECTIVE  Pain Level (0-10 scale): 3  Any medication changes, allergies to medications, adverse drug reactions, diagnosis change, or new procedure performed?: [x] No    [] Yes (see summary sheet for update)  Subjective functional status/changes:   [] No changes reported  \"my legs feel like I have a TENS unit on them with socks. It's been like that for a few months after I had an injection. I have a cyst back there that is going to have to come out some time. I think I may have overdone the last time I was here and then went to the pool. I felt a little more pulling behind the left knee. \"    OBJECTIVE        27 min Therapeutic Exercise:  [x] See flow sheet :   Rationale: increase ROM, increase strength, improve coordination, improve balance and increase proprioception to improve the patients ability to tolerate ADLS and activities     8 min Therapeutic Activity:  [x]  See flow sheet :   Rationale: increase ROM, increase strength, improve coordination, improve balance and increase proprioception  to improve the patients ability to tolerate ADLs and activities     10 min Neuromuscular Re-education:  [x]  See flow sheet :   Rationale: increase ROM, increase strength, improve coordination, improve balance and increase proprioception  to improve the patients ability to tolerate ADLS and activities           With   [x] TE   [x] TA   [x] neuro   [] other: Patient Education: [x] Review HEP    [x] Progressed/Changed HEP based on:   [] positioning   [] body mechanics [] transfers   [] heat/ice application    [x] other: issued orange, green bands for HEP     Other Objective/Functional Measures: VC exercises and technique   TM 5' distance . 10  Green band rows, extensions and pallof press 15X  Leg press DL 45# and SL  30# 15X 2 sets  Added orange band to hooklying clams,       Pain Level (0-10 scale) post treatment: 3    ASSESSMENT/Changes in Function: tolerated well. Tolerated additions and increases well with no increase pain. Issued orange and green bands for HEP. Patient will continue to benefit from skilled PT services to modify and progress therapeutic interventions, address ROM deficits, address strength deficits, analyze and address soft tissue restrictions, analyze and cue movement patterns, analyze and modify body mechanics/ergonomics, assess and modify postural abnormalities, address imbalance/dizziness and instruct in home and community integration to attain remaining goals. [x]  See Plan of Care  []  See progress note/recertification  []  See Discharge Summary         Progress towards goals / Updated goals:   1. Patient will become proficient in their HEP and will be compliant in performing that program.  Evaluation: HEP established.   \"Current: reports compliance daily and pool 3-4X week 6/20/22  Long Term Goals: To be accomplished in 5 weeks:  1. Patient's pain level will be 0-4/10 with activity in order to improve patient's ability to perform normal ADLs. Evaluation: 3-7/10 with activity   Current: 3   6/20/22  2. Patient will increase FOTO score to >/= 56 points to indicate increased functional mobility. Evaluation: 42 points  CURRENT ongoing NA 6/20/22     3. Patient will demonstrate B hip abd MMT >/= 4+/5 to perform ADL/IADLs with greater ease  Evaluation: Left Hip Abd: 3+/5, Right Hip Abd: 4-/5  CURRENT ongoing NA 6/20/22     4.  Patient will report a >/= 75% improvement in current condition in order to demonstrate a return to PLOF  Evaluation: 0  CURRENT ongoing NA 6/20/22    PLAN  [x]  Upgrade activities as tolerated     [x]  Continue plan of care  []  Update interventions per flow sheet       []  Discharge due to:_  []  Other:_      Aleida Bowie, PT 6/20/2022  2:19 PM    Future Appointments   Date Time Provider Martir Richards   6/23/2022 12:00 PM Jarett Ruelas, PT [facility]   6/27/2022  2:15 PM Harpal Parrish PTA [facility]   7/28/2022  1:30 PM MD Leonel Perez   9/12/2022 12:20 PM BECKIE Newberry   9/19/2022  1:20 PM Jorge Cotter, [address] [place] [address]

## 2022-06-23 ENCOUNTER — HOSPITAL ENCOUNTER (OUTPATIENT)
Dept: PHYSICAL THERAPY | Age: 62
Discharge: HOME | End: 2022-06-23
Payer: MEDICARE

## 2022-06-23 PROCEDURE — 97110 THERAPEUTIC EXERCISES: CPT

## 2022-06-23 PROCEDURE — 97112 NEUROMUSCULAR REEDUCATION: CPT

## 2022-06-23 PROCEDURE — 97530 THERAPEUTIC ACTIVITIES: CPT

## 2022-06-23 NOTE — PROGRESS NOTES
PT DAILY TREATMENT NOTE     Patient Name: Nam Herman  Date:2022  : 1960  [x]  Patient  Verified  Payor: VA MEDICARE / Plan: VA MEDICARE PART A & B / Product Type: Medicare /    In time:1203  Out time:1241  Total Treatment Time (min): 38  Visit #: 6 of 10    Medicare/BCBS Only   Total Timed Codes (min):  38 1:1 Treatment Time:  38       Treatment Area: Left knee pain [M25.562]  Right knee pain [M25.561]  Other low back pain [M54.59]    SUBJECTIVE  Pain Level (0- scale): 2   Any medication changes, allergies to medications, adverse drug reactions, diagnosis change, or new procedure performed?: [x] No    [] Yes (see summary sheet for update)  Subjective functional status/changes:   [] No changes reported  Asks to take it easy on the exercises due to left posterior knee issue and tightness she feels.   Notes she walks her dogs 2X a day,       OBJECTIVE         20 min Therapeutic Exercise:  [x] See flow sheet :   Rationale: increase ROM, increase strength, improve coordination, improve balance and increase proprioception to improve the patients ability to tolerate ADLs and activities    8 min Therapeutic Activity:  [x]  See flow sheet :   Rationale: increase ROM, increase strength, improve coordination, improve balance and increase proprioception  to improve the patients ability to tolerate ADLS and activities     10 min Neuromuscular Re-education:  [x]  See flow sheet :   Rationale: increase ROM, increase strength, improve coordination, improve balance and increase proprioception  to improve the patients ability to tolerate ADLs and activities    With   [x] TE   [x] TA   [x] neuro   [] other: Patient Education: [x] Review HEP    [x] Progressed/Changed HEP based on:   [] positioning   [] body mechanics   [] transfers   [] heat/ice application    [] other:      Other Objective/Functional Measures: VC exercises and technique   Increased step ups to 6\"      Pain Level (0-10 scale) post treatment: 2    ASSESSMENT/Changes in Function: tolerated well. Patient will continue to benefit from skilled PT services to modify and progress therapeutic interventions, address ROM deficits, address strength deficits, analyze and address soft tissue restrictions, analyze and cue movement patterns, analyze and modify body mechanics/ergonomics, assess and modify postural abnormalities and instruct in home and community integration to attain remaining goals. [x]  See Plan of Care  []  See progress note/recertification  []  See Discharge Summary         Progress towards goals / Updated goals:    1. Patient will become proficient in their HEP and will be compliant in performing that program.  Evaluation: HEP established.   \"Current: reports compliance daily and pool 3-4X week 6/23/22  Long Term Goals: To be accomplished in 5 weeks:  1. Patient's pain level will be 0-4/10 with activity in order to improve patient's ability to perform normal ADLs. Evaluation: 3-7/10 with activity   Current: 2   6/23/22  2. Patient will increase FOTO score to >/= 56 points to indicate increased functional mobility. Evaluation: 42 points  CURRENT ongoing NA 6/23/22     3. Patient will demonstrate B hip abd MMT >/= 4+/5 to perform ADL/IADLs with greater ease  Evaluation: Left Hip Abd: 3+/5, Right Hip Abd: 4-/5  CURRENT ongoing NA 6/23/22     4.  Patient will report a >/= 75% improvement in current condition in order to demonstrate a return to PLOF  Evaluation: 0  CURRENT ongoing NA 6/23/22    PLAN  [x]  Upgrade activities as tolerated     [x]  Continue plan of care  []  Update interventions per flow sheet       []  Discharge due to:_  [x]  Other:_  Recheck goals and FOTO, DC v recert with hold while on vacation      Anu Mac, PT 6/23/2022  12:04 PM    Future Appointments   Date Time Provider Martir Richards   6/27/2022  2:15 PM Melissa Maxwell PTA [facility]   7/28/2022  1:30 PM Rigoberto Owusu MD [id number] 9/12/2022 12:20 PM Jermaine Murillo, PT [facility]   9/19/2022  1:20 PM Jermaine Murillo, [address] [place] [address]

## 2022-06-27 ENCOUNTER — APPOINTMENT (OUTPATIENT)
Dept: PHYSICAL THERAPY | Age: 62
End: 2022-06-27
Payer: MEDICARE

## 2022-07-08 ENCOUNTER — TELEPHONE (OUTPATIENT)
Dept: PHYSICAL THERAPY | Age: 62
End: 2022-07-08

## 2022-08-01 NOTE — PROGRESS NOTES
[facility]  [address] [facility], [address] [place] [address], [facility] [address]  [phone] fax      Discharge Summary    Patient name: Wayne Melchor     Start of Care: 22  Referral source: Salvador Salvador DO    : 1960  Medical/Treatment Diagnosis: Left knee pain [M25.562]  Right knee pain [M25.561]  Other low back pain [M54.59]  Payor: VA MEDICARE / Plan: VA MEDICARE PART A & B / Product Type: Medicare /        Onset Date:chronic ongoing ~ most recent flare up within past year           Prior Hospitalization: see medical history   Provider#: [id number]  Comorbidities: Erythyromyalgia,Arthritis, High Blood Pressure, Latex Allergy, Hx of Pre Colon Ca, Scolosis, Interstysial Cystitis, Fibromyalgia, Urinary Incontinence, Bladder Suspension/Small Intenstine Surg, Hysterecomty, Right Patellar Fx, COPD, COPE, Chronic Fatigue, Anxiety, Right Knee Surg   Prior Level of Function: Ind/ manageable pain performing ADL/IADLs, self care tasks, recreational participation, ascending/descending stairs and ambulation/standing prolonged durations  Medications: Verified on Patient Summary List    Visits from Start of Care: 6    Missed Visits: 2  Reporting Period : 22 to 22      Summary of Care:Patient presents to clinic secondary to insidious onset of subacute on chronic flare up of low back and B Knee pain. Today's clinical examination demonstrates significant soft tissue restrictions/TTP through B QL/lumbar paraspinals, B proximal gastroc and distal adductors/hamstrings, decreased function (evident by FOTO score), decreased AROM/flexibility, decreased strength/stability and overall mobility limitations/compensatory movement patterns. These limitations affect the patient's ability to perform ADLs/IADLs, self care tasks, and recreational activities, efficiently, safely and pain free. Patient was seen for 6 follow up sessions and latest pain was 2.   She should follow up with MD as needed. Thank you. 1. Patient will become proficient in their HEP and will be compliant in performing that program.  Evaluation: HEP established. \"Current: reports compliance daily and pool 3-4X week 6/23/22   met  Long Term Goals: To be accomplished in 5 weeks:  1. Patient's pain level will be 0-4/10 with activity in order to improve patient's ability to perform normal ADLs. Evaluation: 3-7/10 with activity   Current: 2   6/23/22     progressing  2. Patient will increase FOTO score to >/= 56 points to indicate increased functional mobility. Evaluation: 42 points  CURRENT ongoing NA 6/23/22     not met     3. Patient will demonstrate B hip abd MMT >/= 4+/5 to perform ADL/IADLs with greater ease  Evaluation: Left Hip Abd: 3+/5, Right Hip Abd: 4-/5  CURRENT ongoing NA 6/23/22    not met     4.  Patient will report a >/= 75% improvement in current condition in order to demonstrate a return to PLOF  Evaluation: 0  CURRENT ongoing NA 6/23/22   not met       ASSESSMENT/RECOMMENDATIONS:  []Discontinue therapy progressing towards or have reached established goals  []Discontinue therapy due to lack of appreciable progress towards goals  [x]Discontinue therapy due to lack of attendance or compliance  [x]Other:no further contact, unable to reach patient    Thank you for this referral.     Herber Royal, PT 8/1/2022 12:35 PM

## 2023-07-11 NOTE — PROGRESS NOTES
PT DAILY TREATMENT NOTE/LUMBAR EVAL     Patient Name: Carlota Ga  Date:2020  : 1960  [x]  Patient  Verified  Payor: VA MEDICARE / Plan: VA MEDICARE PART A & B / Product Type: Medicare /    In time:515  Out time:558  Total Treatment Time (min): 43  Visit #: 1 of 12    Medicare/BCBS Only   Total Timed Codes (min):  23 1:1 Treatment Time:  23     Treatment Area: Low back pain [M54.5]  SUBJECTIVE  Pain Level (0-10 scale): 5  [x]constant []intermittent []improving [x]worsening []no change since onset  WORSE bending and twisting BETTER nothing  Any medication changes, allergies to medications, adverse drug reactions, diagnosis change, or new procedure performed?: [x] No    [] Yes (see summary sheet for update)  Subjective functional status/changes:     PLOF:I all areas of ADLs and activities, on disability, tolerated household and community activities, drove   Limitations to PLOF: back pain , additionally has right knee pain and mid back pain   Mechanism of Injury: onset LBP approximately 1 + years ago and attributes onset somewhat related to her right knee involvement   Current symptoms/Complaints: 63 YO female diagnosed as above and with S/S consistent with above diagnosis presents to skilled outpatient PT CCO LBP worse on the left side. Onset 1+ years ago insideous onset that began with left lower leg pain and numbness. With certain movements and positions the pain would shoot up the left leg. CCO pain and soreness even at rest/sitting in the left lower back. She additionally has reports of right knee pain and PMHX with the right knee since  that she also attributes with some of her back pain due to limping.    Previous Treatment/Compliance: MRI and X-rays, MD, lidocaine patches, ice,      PMHx/Surgical Hx: fibromyalgia, arthritis, thyroid problems, HTN, Pre cancer of the colon, interstitial cystitis, latex allergy, knee surgery X 2 right LE  Work Hx: disabled   Living Situation: lives in apartment 3rd floor , no elevator, alone  Pt Goals: to learn how to deal with it so that it doesn't get worse and hope it gets better, avoid surgery  Barriers: [x]pain []financial []time []transportation []other  Motivation:good  Substance use: []Alcohol []Tobacco []other:   FABQ Score: []low []elevate  Cognition: A & O x 4    Other:    OBJECTIVE/EXAMINATION  Domestic Life: household and community activities, walking program   Activity/Recreational Limitations: pain   Mobility: I   Self Care: I           20 min [x]Eval                  []Re-Eval       15 min Therapeutic Exercise:  [x] See flow sheet :   Rationale: increase ROM, increase strength and improve coordination to improve the patients ability to tolerate ADLs and activities    8 min Therapeutic Activity:  [x]  See flow sheet :   Rationale: increase ROM, increase strength and improve coordination  to improve the patients ability to tolerate ADLS and activities                 With   [x] TE   [x] TA   [] neuro   [] other: Patient Education: [x] Review HEP    [] Progressed/Changed HEP based on:   [x] positioning   [] body mechanics   [] transfers   [x] heat/ice application    [x] other: POC, anatomy     Other Objective/Functional Measures:      Physical Therapy Evaluation - Lumbar Spine ([facility])    SUBJECTIVE  Chief Complaint:as above    Mechanism of injury:insideous    Symptoms:  Aggravated by:   [x] Bending [] Sitting [] Standing [] Walking   [] Moving [] Cough [] Sneeze [] Valsalva   [] AM  [] PM  Lying:  [] sup   [] pro   [] sidelying   [x] Other: twisting     Eased by:    [] Bending [] Sitting [] Standing [] Walking   [] Moving [] AM  [] PM  Lying: [] sup  [] pro  [] sidelying   [] Other:     General Health:  Red Flags Indicated? [] Yes    [] No  [] Yes [x] No Recent weight change (If yes, due to dieting?  [] Yes  [] No)   [x] Yes [] No Weakness in legs during walking  [] Yes [] No Unremitting pain at night  Activity dependent  [] Yes [x] No Abdominal pain or problems  [] Yes [x] No Rectal bleeding  [x] Yes [] No Feet more cold or painful in cold weather  [] Yes [x] No Menstrual irregularities  [x] Yes [] No Blood or pain with urination  [x] Yes [] No Dysfunction of bowel or bladder  [] Yes [x] No Recent illness within past 3 weeks (i.e, cold, flu)  [] Yes [x] No Numbness/tingling in buttock/genitalia region    Past History/Treatments:     Diagnostic Tests: [] Lab work [x] X-rays    [] CT [x] MRI     [] Other:  Results:    Functional Status  Prior level of function:as above  Present functional limitations:FOTO  What position do you sleep in?: SL  Left > right    OBJECTIVE  Posture:mild FH and RS  Lateral Shift: [x] R    [x] L     [] +  [x] -  Kyphosis: [] Increased [] Decreased   []  WNL  Lordosis:  [] Increased [x] Decreased   [] WNL  Pelvic symmetry: [] WNL    [] Other:    Gait:  [] Normal     [] Abnormal:  Decrease pace,     Active Movements: [] N/A   [] Too acute   [] Other:  ROM   AROM % PROM Comments:pain, area   Forward flexion 40-60 toes     Extension 20-30 28 degrees  ERP   SB right 20-30 43cm     SB left 20-30 45 cm      Rotation right 5-10 100%     Rotation left 5-10 100%       Repeated Movements   Effects on present pain: produces (SC), abolishes (A), increases (incr), decreases (decr), centralizes (C), peripheral (PH), no effect (NE)   Pre-Test Sx Flexion Repeated Flexion Extension Repeated Extension Repeated SBL Repeated SBR   Sitting          Standing          Lying      N/A N/A   Comments:  Side Glide:  Sustained passive positioning test:    Neuro Screen [] WNL  Myotome/Dermatome/Reflexes:  Comments:    Dural Mobility:  SLR Sitting: [] R    [] L    [] +    [] -  @ (degrees):           Supine: [] R    [] L    [] +    [] -  @ (degrees):   Slump Test: [x] R    [x] L    [] +    [x] -  @ (degrees):    Right LB soreness with right LE testing  Prone Knee Bend: [] R    [] L    [] +    [] -     Palpation  [] Min  [x] Mod  [] Severe    Location:Left SIJ and piriformis TTP  [] Min  [] Mod  [] Severe    Location:  [] Min  [] Mod  [] Severe    Location:    Strength   L(0-5) R (0-5) N/T   Hip Flexion (L1,2) 4+ 4 []   Knee Extension (L3,4) 5 5 []   Ankle Dorsiflexion (L4)   []   Great Toe Extension (L5)   []   Ankle Plantarflexion (S1)   []   Knee Flexion (S1,2) 5 5 []   Upper Abdominals   []   Lower Abdominals   []   Paraspinals   []   Back Rotators   []   Gluteus Eyad   []   Other   []     Special Tests  Lumbar:  Lumb. Compression: [] Pos  [] Neg               Lumbar Distraction:   [] Pos  [] Neg    Quadrant:  [] Pos  [] Neg   [] Flex  [] Ext    Sacroilliac:  Gaenslen's: [] R    [] L    [] +    [] -     Compression: [] +    [] -     Gapping:  [] +    [] -     Thigh Thrust: [] R    [] L    [] +    [] -     Leg Length: [] +    [] -   Position:    Crests:    ASIS:    PSIS:    Sacral Sulcus:    Mobility: Standing flex:     Sitting flex:     Supine to sit:     Prone knee bend:         Hip: [place]:  [] R    [] L    [] +    [] -     Scour:  [] R    [] L    [] +    [] -     Piriformis: [x] R    [] L    [x] +    [] -          Deficits: Moiz's: [] R    [] L    [] +    [] -     Richard: [] R    [] L    [] +    [] -     Hamstrings 90/90:    Gastrocsoleus (to neutral): Right: Left:       Global Muscular Weakness:  Abdominals:  Quadratus Lumborum:  Paraspinals: Other:    Other tests/comments:       Pain Level (0-10 scale) post treatment: 5    ASSESSMENT/Changes in Function: Patient demonstrates the potential to make gains with improved ROM, strength, endurance/activity tolerance, functional FOTO survey score   and all within a reasonable time frame so as to increase their functional independence with ADLs and activities for carryover to  Improved quality of life, tolerance to household and community activities and ability to resume her walking program. Patient requires skilled Physical Therapy so as to monitor their response to and modify their treatment plan accordingly.  Patient appears to be an appropriate candidate for skilled outpatient Physical Therapy. Patient will continue to benefit from skilled PT services to modify and progress therapeutic interventions, address ROM deficits, address strength deficits, analyze and address soft tissue restrictions, analyze and cue movement patterns, analyze and modify body mechanics/ergonomics, assess and modify postural abnormalities and instruct in home and community integration to attain remaining goals.      [x]  See Plan of Care  []  See progress note/recertification  []  See Discharge Summary         Progress towards goals / Updated goals:       PLAN  [x]  Upgrade activities as tolerated     [x]  Continue plan of care  []  Update interventions per flow sheet       []  Discharge due to:_  []  Other:_      Edwardo Books, PT 11/2/2020  5:20 PM Patient expressed no known problems or needs